# Patient Record
Sex: FEMALE | Race: WHITE | NOT HISPANIC OR LATINO | Employment: OTHER | URBAN - NONMETROPOLITAN AREA
[De-identification: names, ages, dates, MRNs, and addresses within clinical notes are randomized per-mention and may not be internally consistent; named-entity substitution may affect disease eponyms.]

---

## 2017-11-08 ENCOUNTER — TRANSCRIBE ORDERS (OUTPATIENT)
Dept: MAMMOGRAPHY | Facility: HOSPITAL | Age: 67
End: 2017-11-08

## 2017-11-08 DIAGNOSIS — Z12.31 ENCOUNTER FOR SCREENING MAMMOGRAM FOR BREAST CANCER: Primary | ICD-10-CM

## 2017-12-11 ENCOUNTER — HOSPITAL ENCOUNTER (OUTPATIENT)
Dept: MAMMOGRAPHY | Facility: HOSPITAL | Age: 67
Discharge: HOME | End: 2017-12-11
Admitting: INTERNAL MEDICINE

## 2017-12-11 DIAGNOSIS — Z12.31 ENCOUNTER FOR SCREENING MAMMOGRAM FOR BREAST CANCER: ICD-10-CM

## 2017-12-11 PROCEDURE — G0202 SCR MAMMO BI INCL CAD: HCPCS

## 2017-12-11 PROCEDURE — 77063 BREAST TOMOSYNTHESIS BI: CPT

## 2018-06-18 ENCOUNTER — OFFICE VISIT (OUTPATIENT)
Dept: SURGERY | Facility: CLINIC | Age: 68
End: 2018-06-18

## 2018-06-18 VITALS
DIASTOLIC BLOOD PRESSURE: 64 MMHG | TEMPERATURE: 98.4 F | HEART RATE: 78 BPM | HEIGHT: 64 IN | SYSTOLIC BLOOD PRESSURE: 110 MMHG | BODY MASS INDEX: 18.1 KG/M2 | RESPIRATION RATE: 18 BRPM | WEIGHT: 106 LBS | OXYGEN SATURATION: 99 %

## 2018-06-18 DIAGNOSIS — K21.9 GASTROESOPHAGEAL REFLUX DISEASE, ESOPHAGITIS PRESENCE NOT SPECIFIED: ICD-10-CM

## 2018-06-18 DIAGNOSIS — R10.13 EPIGASTRIC PAIN: ICD-10-CM

## 2018-06-18 DIAGNOSIS — Z12.11 SCREENING FOR COLON CANCER: ICD-10-CM

## 2018-06-18 DIAGNOSIS — R13.11 ORAL PHASE DYSPHAGIA: Primary | ICD-10-CM

## 2018-06-18 PROCEDURE — 99204 OFFICE O/P NEW MOD 45 MIN: CPT | Performed by: SURGERY

## 2018-06-18 RX ORDER — FLUTICASONE PROPIONATE 50 MCG
SPRAY, SUSPENSION (ML) NASAL
COMMUNITY

## 2018-06-18 RX ORDER — ONDANSETRON 4 MG/1
TABLET, FILM COATED ORAL
COMMUNITY
Start: 2018-06-14

## 2018-06-18 RX ORDER — DEXLANSOPRAZOLE 60 MG/1
CAPSULE, DELAYED RELEASE ORAL
COMMUNITY
Start: 2018-06-14 | End: 2018-06-29 | Stop reason: ALTCHOICE

## 2018-06-18 RX ORDER — SUCRALFATE 1 G/1
TABLET ORAL
COMMUNITY
Start: 2018-05-29 | End: 2018-06-25 | Stop reason: ALTCHOICE

## 2018-06-18 RX ORDER — LEVOTHYROXINE SODIUM 0.05 MG/1
TABLET ORAL
COMMUNITY

## 2018-06-18 NOTE — PROGRESS NOTES
"Patient: Jennifer Gilliland    YOB: 1950    Date: 06/18/2018    Primary Care Provider: Nathan Fernandez MD    Chief Complaint   Patient presents with   • Heartburn       Subjective .     History of present illness:  Pt is here for evaluation of bouts of heartburn and indigestion and excessive \"burping\" which has been ongoing for @ 3-4 weeks, she also complains of acid reflux and sometimes at night she awakens with \"acid\" in her throat.Pt also complains of recent weight loss @ 4-5 pounds within the last 2 weeks. Pt stated that her symptoms get much worse when eating spicy and greasy food. Pt is currently taking Dexilant and she stated that although she has only taken 4 doses she is feeling \"some better.\" Patient also taking Carafate for reflux with no improvement.  Also on Zofran as needed but she has not taken any of those pills at this point.  No dark stools, pain in epigastric region, radiates to her back and chest.    The following portions of the patient's history were reviewed and updated as appropriate: allergies, current medications, past family history, past medical history, past social history, past surgical history and problem list.      Review of Systems   Constitutional: Negative for chills, fever and unexpected weight change.   HENT: Negative for hearing loss, trouble swallowing and voice change.    Eyes: Negative for visual disturbance.   Respiratory: Negative for apnea, cough, chest tightness, shortness of breath and wheezing.    Cardiovascular: Negative for chest pain, palpitations and leg swelling.   Gastrointestinal: Positive for abdominal distention and nausea. Negative for abdominal pain, anal bleeding, blood in stool, constipation, diarrhea, rectal pain and vomiting.   Endocrine: Negative for cold intolerance and heat intolerance.   Genitourinary: Negative for difficulty urinating, dysuria and flank pain.   Musculoskeletal: Negative for back pain and gait problem.   Skin: Negative for " "color change, rash and wound.   Neurological: Negative for dizziness, syncope, speech difficulty, weakness, light-headedness, numbness and headaches.   Hematological: Negative for adenopathy. Does not bruise/bleed easily.   Psychiatric/Behavioral: Negative for confusion. The patient is not nervous/anxious.        Allergies:  Allergies   Allergen Reactions   • Amoxicillin Rash   • Sulfa Antibiotics Rash       Medications:    Current Outpatient Prescriptions:   •  DEXILANT 60 MG capsule, , Disp: , Rfl:   •  fluticasone (FLONASE) 50 MCG/ACT nasal spray, fluticasone 50 mcg/actuation nasal spray,suspension, Disp: , Rfl:   •  levothyroxine (SYNTHROID, LEVOTHROID) 50 MCG tablet, levothyroxine 50 mcg tablet, Disp: , Rfl:   •  sucralfate (CARAFATE) 1 g tablet, , Disp: , Rfl:   •  ondansetron (ZOFRAN) 4 MG tablet, , Disp: , Rfl:     History\"  Past Medical History:   Diagnosis Date   • Thyroid disease     hypothyroidism       Past Surgical History:   Procedure Laterality Date   • BREAST BIOPSY         Family History   Problem Relation Age of Onset   • Breast cancer Sister        Social History   Substance Use Topics   • Smoking status: Former Smoker   • Smokeless tobacco: Never Used   • Alcohol use No        Objective     Vital Signs:   Vitals:    06/18/18 1345   BP: 110/64   Pulse: 78   Resp: 18   Temp: 98.4 °F (36.9 °C)   TempSrc: Temporal Artery    SpO2: 99%   Weight: 48.1 kg (106 lb)   Height: 162.6 cm (64\")       Physical Exam:   General Appearance:    Alert, cooperative, in no acute distress   Head:    Normocephalic, without obvious abnormality, atraumatic   Eyes:            Lids and lashes normal, conjunctivae and sclerae normal, no   icterus, no pallor, corneas clear, PERRLA   Ears:    Ears appear intact with no abnormalities noted   Throat:   No oral lesions, no thrush, oral mucosa moist   Neck:   No adenopathy, supple, trachea midline, no thyromegaly, no   carotid bruit, no JVD   Lungs:     Clear to " auscultation,respirations regular, even and                  unlabored    Heart:    Regular rhythm and normal rate, normal S1 and S2, no            murmur, no gallop, no rub, no click   Chest Wall:    No abnormalities observed   Abdomen:     Normal bowel sounds, no masses, no organomegaly, soft        And tender in the right upper quadrant.     Extremities:   Moves all extremities well, no edema, no cyanosis, no             redness   Pulses:   Pulses palpable and equal bilaterally   Skin:   No bleeding, bruising or rash   Lymph nodes:   No palpable adenopathy   Neurologic:   Cranial nerves 2 - 12 grossly intact, sensation intact, DTR       present and equal bilaterally     Results Review:   I reviewed the patient's new clinical results.    Assessment/Plan     1. Oral phase dysphagia    2. Gastroesophageal reflux disease, esophagitis presence not specified    3. Screening for colon cancer    4. Epigastric pain        Patient scheduled for EGD tomorrow, concerned about weight loss and significant reflux symptoms.  Patient may have a stricture as well.  Risk of bleeding perforation discussed and patient agreeable.  Patient also will need a colonoscopy in the near future once her resolve her current problem.    I discussed the patients findings and my recommendations with patient    Review of Systems was reviewed and confirmed as accurate today.    Electronically signed by Shanti Arredondo MD  06/18/18      .        Portions of this note have been scribed for Shanti Arredondo MD by Lara Domínguez. 6/18/2018  2:41 PM

## 2018-06-19 ENCOUNTER — OUTSIDE FACILITY SERVICE (OUTPATIENT)
Dept: SURGERY | Facility: CLINIC | Age: 68
End: 2018-06-19

## 2018-06-19 PROCEDURE — 43239 EGD BIOPSY SINGLE/MULTIPLE: CPT | Performed by: SURGERY

## 2018-06-25 ENCOUNTER — OFFICE VISIT (OUTPATIENT)
Dept: SURGERY | Facility: CLINIC | Age: 68
End: 2018-06-25

## 2018-06-25 VITALS
SYSTOLIC BLOOD PRESSURE: 118 MMHG | DIASTOLIC BLOOD PRESSURE: 82 MMHG | HEART RATE: 88 BPM | HEIGHT: 64 IN | BODY MASS INDEX: 17.93 KG/M2 | TEMPERATURE: 98.8 F | WEIGHT: 105 LBS | RESPIRATION RATE: 18 BRPM | OXYGEN SATURATION: 100 %

## 2018-06-25 DIAGNOSIS — K59.04 CHRONIC IDIOPATHIC CONSTIPATION: ICD-10-CM

## 2018-06-25 DIAGNOSIS — R10.11 RIGHT UPPER QUADRANT ABDOMINAL PAIN: ICD-10-CM

## 2018-06-25 DIAGNOSIS — B96.89 GASTRITIS DUE TO HELICOBACTER HEILMANNII: Primary | ICD-10-CM

## 2018-06-25 DIAGNOSIS — K29.60 GASTRITIS DUE TO HELICOBACTER HEILMANNII: Primary | ICD-10-CM

## 2018-06-25 DIAGNOSIS — K21.00 GASTROESOPHAGEAL REFLUX DISEASE WITH ESOPHAGITIS: ICD-10-CM

## 2018-06-25 PROCEDURE — 99213 OFFICE O/P EST LOW 20 MIN: CPT | Performed by: SURGERY

## 2018-06-25 RX ORDER — LANSOPRAZOLE, AMOXICILLIN, CLARITHROMYCIN 30-500-500
KIT ORAL 2 TIMES DAILY
Qty: 14 KIT | Refills: 0 | Status: SHIPPED | OUTPATIENT
Start: 2018-06-25 | End: 2018-06-29 | Stop reason: ALTCHOICE

## 2018-06-25 NOTE — PROGRESS NOTES
"Patient: Jennifer Gilliland    YOB: 1950    Date: 06/25/2018    Primary Care Provider: Nathan Fernandez MD    Reason for Consultation: Follow-up EGD    Chief Complaint   Patient presents with   • Follow-up     egd with biopsies and gallbladder ultrasound.       History of present illness:  I saw the patient in the office today as a followup from their recent EGD with biopsy, the antrum pathology report did show chronic gastritis and Helicobacter, ge junction biopsy showed reactive squamous mucosa with changes compatible with reflux esophagitis and chronic gastritis..  They state that they are still having nausea and lots of belching after eating.  Gallbladder ultrasound was performed on 06/20/2018, it was normal.Continues to have right upper quadrant pain and nausea with bloating.  Minimal relief with PPI and Carafate    The following portions of the patient's history were reviewed and updated as appropriate: allergies, current medications, past family history, past medical history, past social history, past surgical history and problem list.      Review of Systems    Vital Signs:   Vitals:    06/25/18 1414   BP: 118/82   Pulse: 88   Resp: 18   Temp: 98.8 °F (37.1 °C)   TempSrc: Temporal Artery    SpO2: 100%   Weight: 47.6 kg (105 lb)   Height: 162.6 cm (64\")       Allergies:  Allergies   Allergen Reactions   • Amoxicillin Rash   • Sulfa Antibiotics Rash       Medications:    Current Outpatient Prescriptions:   •  DEXILANT 60 MG capsule, , Disp: , Rfl:   •  fluticasone (FLONASE) 50 MCG/ACT nasal spray, fluticasone 50 mcg/actuation nasal spray,suspension, Disp: , Rfl:   •  levothyroxine (SYNTHROID, LEVOTHROID) 50 MCG tablet, levothyroxine 50 mcg tablet, Disp: , Rfl:   •  ondansetron (ZOFRAN) 4 MG tablet, , Disp: , Rfl:     Physical Exam:   General Appearance:    Alert, cooperative, in no acute distress   Abdomen:     no masses, no organomegaly, soft non-tender, non-distended, no guarding, wounds are well " healed, no evidence of recurrent hernia   Chest:      Clear toausculation            Cor:  Regular rate and rhythm      Results Review:   I reviewed the patient's new clinical results.    Assessment / Plan:    1. 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 Gastroesophageal reflux disease with esophagitis    3. Right upper quadrant abdominal pain    4. Chronic idiopathic constipation        I did discuss the situation with the patient today in the office and they have done well from their recent EGD with biopsy. I have told the patient We'll place her on Relpax for 2 weeks, follow-up in 3 weeks, at that time if symptoms persist will need to schedule HIDA scan.  Patient also told to take MiraLAX that she needs it for constipation..    Electronically signed by Shanti Arredondo MD  06/25/18          Portions of this note have been scribed for Shanti Arredondo MD by Lara Domínguez. 6/25/2018  2:48 PM

## 2018-06-26 ENCOUNTER — TELEPHONE (OUTPATIENT)
Dept: SURGERY | Facility: CLINIC | Age: 68
End: 2018-06-26

## 2018-06-26 NOTE — TELEPHONE ENCOUNTER
Patient was given a Pre-Moses she is allergic to Amoxicillin Per Dr Arredondo ask the pharmacist their recommendation in place of amoxicillin. They suggested Peptobismal tetracycline and the Lansoprazole. We agreed

## 2018-06-28 ENCOUNTER — HOSPITAL ENCOUNTER (EMERGENCY)
Facility: HOSPITAL | Age: 68
Discharge: HOME | End: 2018-06-28
Attending: EMERGENCY MEDICINE | Admitting: EMERGENCY MEDICINE

## 2018-06-28 ENCOUNTER — APPOINTMENT (OUTPATIENT)
Dept: GENERAL RADIOLOGY | Facility: HOSPITAL | Age: 68
End: 2018-06-28

## 2018-06-28 ENCOUNTER — TELEPHONE (OUTPATIENT)
Dept: SURGERY | Facility: CLINIC | Age: 68
End: 2018-06-28

## 2018-06-28 VITALS
WEIGHT: 108 LBS | HEART RATE: 67 BPM | HEIGHT: 64 IN | TEMPERATURE: 98.1 F | RESPIRATION RATE: 18 BRPM | BODY MASS INDEX: 18.44 KG/M2 | SYSTOLIC BLOOD PRESSURE: 119 MMHG | DIASTOLIC BLOOD PRESSURE: 58 MMHG | OXYGEN SATURATION: 98 %

## 2018-06-28 DIAGNOSIS — K21.9 GASTROESOPHAGEAL REFLUX DISEASE, ESOPHAGITIS PRESENCE NOT SPECIFIED: ICD-10-CM

## 2018-06-28 DIAGNOSIS — R06.00 DYSPNEA, UNSPECIFIED TYPE: Primary | ICD-10-CM

## 2018-06-28 LAB
ALBUMIN SERPL-MCNC: 4.8 G/DL (ref 3.5–5)
ALBUMIN/GLOB SERPL: 1.7 G/DL (ref 1–2)
ALP SERPL-CCNC: 81 U/L (ref 38–126)
ALT SERPL W P-5'-P-CCNC: 18 U/L (ref 13–69)
ANION GAP SERPL CALCULATED.3IONS-SCNC: 15 MMOL/L (ref 10–20)
AST SERPL-CCNC: 26 U/L (ref 15–46)
BASOPHILS # BLD AUTO: 0.05 10*3/MM3 (ref 0–0.2)
BASOPHILS NFR BLD AUTO: 0.8 % (ref 0–2.5)
BILIRUB SERPL-MCNC: 0.4 MG/DL (ref 0.2–1.3)
BUN BLD-MCNC: 19 MG/DL (ref 7–20)
BUN/CREAT SERPL: 27.1 (ref 7.1–23.5)
CALCIUM SPEC-SCNC: 9.4 MG/DL (ref 8.4–10.2)
CHLORIDE SERPL-SCNC: 103 MMOL/L (ref 98–107)
CO2 SERPL-SCNC: 28 MMOL/L (ref 26–30)
CREAT BLD-MCNC: 0.7 MG/DL (ref 0.6–1.3)
D-DIMER, QUANTITATIVE (MAD,POW, STR): 361 NG/ML (FEU) (ref 0–500)
DEPRECATED RDW RBC AUTO: 43.8 FL (ref 37–54)
EOSINOPHIL # BLD AUTO: 0.06 10*3/MM3 (ref 0–0.7)
EOSINOPHIL NFR BLD AUTO: 1 % (ref 0–7)
ERYTHROCYTE [DISTWIDTH] IN BLOOD BY AUTOMATED COUNT: 12.8 % (ref 11.5–14.5)
GFR SERPL CREATININE-BSD FRML MDRD: 84 ML/MIN/1.73
GLOBULIN UR ELPH-MCNC: 2.8 GM/DL
GLUCOSE BLD-MCNC: 99 MG/DL (ref 74–98)
HCT VFR BLD AUTO: 39.5 % (ref 37–47)
HGB BLD-MCNC: 13.3 G/DL (ref 12–16)
HOLD SPECIMEN: NORMAL
HOLD SPECIMEN: NORMAL
IMM GRANULOCYTES # BLD: 0.01 10*3/MM3 (ref 0–0.06)
IMM GRANULOCYTES NFR BLD: 0.2 % (ref 0–0.6)
LYMPHOCYTES # BLD AUTO: 1.17 10*3/MM3 (ref 0.6–3.4)
LYMPHOCYTES NFR BLD AUTO: 19.1 % (ref 10–50)
MCH RBC QN AUTO: 31.5 PG (ref 27–31)
MCHC RBC AUTO-ENTMCNC: 33.7 G/DL (ref 30–37)
MCV RBC AUTO: 93.6 FL (ref 81–99)
MONOCYTES # BLD AUTO: 0.56 10*3/MM3 (ref 0–0.9)
MONOCYTES NFR BLD AUTO: 9.1 % (ref 0–12)
NEUTROPHILS # BLD AUTO: 4.29 10*3/MM3 (ref 2–6.9)
NEUTROPHILS NFR BLD AUTO: 69.8 % (ref 37–80)
NRBC BLD MANUAL-RTO: 0 /100 WBC (ref 0–0)
NT-PROBNP SERPL-MCNC: 98 PG/ML (ref 0–125)
PLATELET # BLD AUTO: 191 10*3/MM3 (ref 130–400)
PMV BLD AUTO: 11.5 FL (ref 6–12)
POTASSIUM BLD-SCNC: 4 MMOL/L (ref 3.5–5.1)
PROT SERPL-MCNC: 7.6 G/DL (ref 6.3–8.2)
RBC # BLD AUTO: 4.22 10*6/MM3 (ref 4.2–5.4)
SODIUM BLD-SCNC: 142 MMOL/L (ref 137–145)
TROPONIN I SERPL-MCNC: <0.012 NG/ML (ref 0–0.03)
WBC NRBC COR # BLD: 6.14 10*3/MM3 (ref 4.8–10.8)
WHOLE BLOOD HOLD SPECIMEN: NORMAL
WHOLE BLOOD HOLD SPECIMEN: NORMAL

## 2018-06-28 PROCEDURE — 25010000002 DIPHENHYDRAMINE PER 50 MG: Performed by: PHYSICIAN ASSISTANT

## 2018-06-28 PROCEDURE — 85379 FIBRIN DEGRADATION QUANT: CPT | Performed by: PHYSICIAN ASSISTANT

## 2018-06-28 PROCEDURE — 83880 ASSAY OF NATRIURETIC PEPTIDE: CPT | Performed by: EMERGENCY MEDICINE

## 2018-06-28 PROCEDURE — 96374 THER/PROPH/DIAG INJ IV PUSH: CPT

## 2018-06-28 PROCEDURE — 25010000002 ONDANSETRON PER 1 MG: Performed by: PHYSICIAN ASSISTANT

## 2018-06-28 PROCEDURE — 25010000002 METHYLPREDNISOLONE PER 40 MG: Performed by: PHYSICIAN ASSISTANT

## 2018-06-28 PROCEDURE — 80053 COMPREHEN METABOLIC PANEL: CPT | Performed by: EMERGENCY MEDICINE

## 2018-06-28 PROCEDURE — 99284 EMERGENCY DEPT VISIT MOD MDM: CPT

## 2018-06-28 PROCEDURE — 84484 ASSAY OF TROPONIN QUANT: CPT | Performed by: EMERGENCY MEDICINE

## 2018-06-28 PROCEDURE — 96375 TX/PRO/DX INJ NEW DRUG ADDON: CPT

## 2018-06-28 PROCEDURE — 71046 X-RAY EXAM CHEST 2 VIEWS: CPT

## 2018-06-28 PROCEDURE — 93005 ELECTROCARDIOGRAM TRACING: CPT | Performed by: EMERGENCY MEDICINE

## 2018-06-28 PROCEDURE — 85025 COMPLETE CBC W/AUTO DIFF WBC: CPT | Performed by: EMERGENCY MEDICINE

## 2018-06-28 RX ORDER — ONDANSETRON 2 MG/ML
4 INJECTION INTRAMUSCULAR; INTRAVENOUS ONCE
Status: COMPLETED | OUTPATIENT
Start: 2018-06-28 | End: 2018-06-28

## 2018-06-28 RX ORDER — LANSOPRAZOLE 30 MG/1
30 CAPSULE, DELAYED RELEASE ORAL DAILY
COMMUNITY
End: 2018-06-29 | Stop reason: ALTCHOICE

## 2018-06-28 RX ORDER — METRONIDAZOLE 500 MG/1
500 TABLET ORAL 3 TIMES DAILY
COMMUNITY
End: 2018-06-29 | Stop reason: ALTCHOICE

## 2018-06-28 RX ORDER — DIPHENHYDRAMINE HYDROCHLORIDE 50 MG/ML
6 INJECTION INTRAMUSCULAR; INTRAVENOUS ONCE
Status: COMPLETED | OUTPATIENT
Start: 2018-06-28 | End: 2018-06-28

## 2018-06-28 RX ORDER — METHYLPREDNISOLONE SODIUM SUCCINATE 40 MG/ML
80 INJECTION, POWDER, LYOPHILIZED, FOR SOLUTION INTRAMUSCULAR; INTRAVENOUS ONCE
Status: COMPLETED | OUTPATIENT
Start: 2018-06-28 | End: 2018-06-28

## 2018-06-28 RX ORDER — DIPHENHYDRAMINE HCL 12.5MG/5ML
12.5 LIQUID (ML) ORAL ONCE
Status: DISCONTINUED | OUTPATIENT
Start: 2018-06-28 | End: 2018-06-28

## 2018-06-28 RX ORDER — CLARITHROMYCIN 500 MG/1
500 TABLET, COATED ORAL 2 TIMES DAILY
COMMUNITY
End: 2018-06-29 | Stop reason: ALTCHOICE

## 2018-06-28 RX ORDER — SODIUM CHLORIDE 0.9 % (FLUSH) 0.9 %
10 SYRINGE (ML) INJECTION AS NEEDED
Status: DISCONTINUED | OUTPATIENT
Start: 2018-06-28 | End: 2018-06-28 | Stop reason: HOSPADM

## 2018-06-28 RX ADMIN — METHYLPREDNISOLONE SODIUM SUCCINATE 80 MG: 40 INJECTION, POWDER, FOR SOLUTION INTRAMUSCULAR; INTRAVENOUS at 15:05

## 2018-06-28 RX ADMIN — DIPHENHYDRAMINE HYDROCHLORIDE 6 MG: 50 INJECTION, SOLUTION INTRAMUSCULAR; INTRAVENOUS at 15:05

## 2018-06-28 RX ADMIN — ONDANSETRON 4 MG: 2 INJECTION, SOLUTION INTRAMUSCULAR; INTRAVENOUS at 15:05

## 2018-06-28 NOTE — TELEPHONE ENCOUNTER
Patient called and stated that she has been sick all night, can't breath, has to take a deep breath then feels some better. Ask Ilene she suggest that she needs to go to the emergency room at [facility]. Patient understood and stated that she will go.

## 2018-06-29 ENCOUNTER — OFFICE VISIT (OUTPATIENT)
Dept: SURGERY | Facility: CLINIC | Age: 68
End: 2018-06-29

## 2018-06-29 VITALS
BODY MASS INDEX: 18.1 KG/M2 | SYSTOLIC BLOOD PRESSURE: 120 MMHG | HEIGHT: 64 IN | HEART RATE: 76 BPM | RESPIRATION RATE: 16 BRPM | WEIGHT: 106 LBS | DIASTOLIC BLOOD PRESSURE: 84 MMHG | OXYGEN SATURATION: 95 % | TEMPERATURE: 98.5 F

## 2018-06-29 DIAGNOSIS — B96.89 GASTRITIS DUE TO HELICOBACTER HEILMANNII: ICD-10-CM

## 2018-06-29 DIAGNOSIS — K21.00 GASTROESOPHAGEAL REFLUX DISEASE WITH ESOPHAGITIS: ICD-10-CM

## 2018-06-29 DIAGNOSIS — K29.60 GASTRITIS DUE TO HELICOBACTER HEILMANNII: ICD-10-CM

## 2018-06-29 DIAGNOSIS — R10.11 RIGHT UPPER QUADRANT ABDOMINAL PAIN: Primary | ICD-10-CM

## 2018-06-29 DIAGNOSIS — R10.11 RUQ PAIN: Primary | ICD-10-CM

## 2018-06-29 PROCEDURE — 99213 OFFICE O/P EST LOW 20 MIN: CPT | Performed by: SURGERY

## 2018-06-29 NOTE — PROGRESS NOTES
"Patient: Jennifer Gilliland    YOB: 1950    Date: 06/29/2018    Primary Care Provider: Nathan Fernandez MD    Chief Complaint   Patient presents with   • Follow-up     abdominal pain with nausea.       History: Pt was recently diagnosed with H-pylori, she was being treated with Biaxin, Flagyl  and Prevacid, she is here for follow-up emergency room visit yesterday, she stated that she was having shortness of breath and epigastric pain with nausea when she went to the emergency room, exam at the emergency room determined that the patient was\" intolerant to Flagyl vs Biaxin.\"  Pt stated that she was told to stop taking the medications and she is feeling better although she is still nauseated and having some pain when she eats.Patient does have allergies, chest pain not consistent with coronary disease, cardiac workup was negative in ER last night.  Gallbladder ultrasound was normal, patient still will need a gallbladder HIDA scan.    The following portions of the patient's history were reviewed and updated as appropriate: allergies, current medications, past family history, past medical history, past social history, past surgical history and problem list.      Review of Systems   Constitutional: Negative for chills, fever and unexpected weight change.   HENT: Negative for hearing loss, trouble swallowing and voice change.    Eyes: Negative for visual disturbance.   Respiratory: Negative for apnea, cough, chest tightness, shortness of breath and wheezing.    Cardiovascular: Negative for chest pain, palpitations and leg swelling.   Gastrointestinal: Positive for abdominal pain and nausea. Negative for abdominal distention, anal bleeding, blood in stool, constipation, diarrhea, rectal pain and vomiting.   Endocrine: Negative for cold intolerance and heat intolerance.   Genitourinary: Negative for difficulty urinating, dysuria and flank pain.   Musculoskeletal: Negative for back pain and gait problem.   Skin: " "Negative for color change, rash and wound.   Neurological: Negative for dizziness, syncope, speech difficulty, weakness, light-headedness, numbness and headaches.   Hematological: Negative for adenopathy. Does not bruise/bleed easily.   Psychiatric/Behavioral: Negative for confusion. The patient is not nervous/anxious.        Vital Signs  Vitals:    06/29/18 1332   BP: 120/84   Pulse: 76   Resp: 16   Temp: 98.5 °F (36.9 °C)   TempSrc: Temporal Artery    SpO2: 95%   Weight: 48.1 kg (106 lb)   Height: 162.6 cm (64\")       Allergies:  Allergies   Allergen Reactions   • Amoxicillin Rash   • Sulfa Antibiotics Rash       Medications:    Current Outpatient Prescriptions:   •  fluticasone (FLONASE) 50 MCG/ACT nasal spray, fluticasone 50 mcg/actuation nasal spray,suspension, Disp: , Rfl:   •  levothyroxine (SYNTHROID, LEVOTHROID) 50 MCG tablet, levothyroxine 50 mcg tablet, Disp: , Rfl:   •  ondansetron (ZOFRAN) 4 MG tablet, , Disp: , Rfl:   •  diphenhydrAMINE (BENYLIN) 12.5 MG/5ML syrup, Take 5 mL by mouth 2 (Two) Times a Day As Needed for Allergies., Disp: 100 mL, Rfl: 0  No current facility-administered medications for this visit.     Physical Exam:   General Appearance:    Alert, cooperative, in no acute distress   Head:    Normocephalic, without obvious abnormality, atraumatic   Lungs:     Clear to auscultation,respirations regular, even and                  unlabored    Heart:    Regular rhythm and normal rate, normal S1 and S2, no            murmur, no gallop, no rub, no click   Abdomen:     Normal bowel sounds, no masses, no organomegaly, soft       And tender in the right upper quadrant with moderate guarding.     Extremities:   Moves all extremities well, no edema, no cyanosis, no             redness   Pulses:   Pulses palpable and equal bilaterally   Skin:   No bleeding, bruising or rash       Results Review:   I reviewed the patient's new clinical results.     Assessment/Plan     1. Right upper quadrant abdominal " pain    2. 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 Gastroesophageal reflux disease with esophagitis      Patient scheduled for HIDA scan and follow-up in one week.  Also told to hold off on taking Prevpac.  If HIDA scan negative, we'll try to attempt another type antibiotics to treat H. pylori infection.  Continue a low-fat diet.  Continue her PPI medication in the interim.    Electronically signed by Shanti Arredondo MD  06/29/18    Portions of this note have been scribed for Shanti Arredondo MD by Lara Domínguez. 6/29/2018  2:35 PM

## 2018-07-03 ENCOUNTER — HOSPITAL ENCOUNTER (OUTPATIENT)
Dept: NUCLEAR MEDICINE | Facility: HOSPITAL | Age: 68
Discharge: HOME | End: 2018-07-03
Attending: SURGERY

## 2018-07-03 DIAGNOSIS — R10.11 RUQ PAIN: ICD-10-CM

## 2018-07-03 PROCEDURE — A9537 TC99M MEBROFENIN: HCPCS | Performed by: SURGERY

## 2018-07-03 PROCEDURE — 25010000002 SINCALIDE PER 5 MCG: Performed by: SURGERY

## 2018-07-03 PROCEDURE — 0 TECHNETIUM TC 99M MEBROFENIN KIT: Performed by: SURGERY

## 2018-07-03 PROCEDURE — 78227 HEPATOBIL SYST IMAGE W/DRUG: CPT

## 2018-07-03 RX ORDER — KIT FOR THE PREPARATION OF TECHNETIUM TC 99M MEBROFENIN 45 MG/10ML
1 INJECTION, POWDER, LYOPHILIZED, FOR SOLUTION INTRAVENOUS
Status: COMPLETED | OUTPATIENT
Start: 2018-07-03 | End: 2018-07-03

## 2018-07-03 RX ADMIN — MEBROFENIN 1 DOSE: 45 INJECTION, POWDER, LYOPHILIZED, FOR SOLUTION INTRAVENOUS at 11:52

## 2018-07-03 RX ADMIN — SINCALIDE 1 MCG: 5 INJECTION, POWDER, LYOPHILIZED, FOR SOLUTION INTRAVENOUS at 12:25

## 2018-07-06 ENCOUNTER — OFFICE VISIT (OUTPATIENT)
Dept: SURGERY | Facility: CLINIC | Age: 68
End: 2018-07-06

## 2018-07-06 VITALS
BODY MASS INDEX: 18.1 KG/M2 | TEMPERATURE: 99.1 F | WEIGHT: 106 LBS | HEART RATE: 76 BPM | SYSTOLIC BLOOD PRESSURE: 132 MMHG | OXYGEN SATURATION: 99 % | DIASTOLIC BLOOD PRESSURE: 72 MMHG | RESPIRATION RATE: 14 BRPM | HEIGHT: 64 IN

## 2018-07-06 DIAGNOSIS — K82.8 BILIARY DYSKINESIA: Primary | ICD-10-CM

## 2018-07-06 DIAGNOSIS — K21.00 GASTROESOPHAGEAL REFLUX DISEASE WITH ESOPHAGITIS: ICD-10-CM

## 2018-07-06 PROCEDURE — 99213 OFFICE O/P EST LOW 20 MIN: CPT | Performed by: SURGERY

## 2018-07-06 NOTE — PROGRESS NOTES
Patient: Jennifer Gilliland    YOB: 1950    Date: 07/06/2018    Primary Care Provider: Nathan Fernandez MD    Chief Complaint   Patient presents with   • Follow-up     follow up hida scan results       Subjective .     History of present illness:  I saw the patient in the office today as a consultation for evaluation and treatment of nausea.  Patient had a hida scan EF 32.4%. Patient has symptoms with nausea and pain reproduced by Kinevac.  Patient has daily episodes of nausea and vomiting with intolerance of almost any food.  No significant gastritis or esophagitis found on EGD.  She does have H pylori, unable to tolerate Prevpac at this time.    The following portions of the patient's history were reviewed and updated as appropriate: allergies, current medications, past family history, past medical history, past social history, past surgical history and problem list.    Review of Systems   Constitutional: Positive for appetite change. Negative for fatigue and fever.   HENT: Negative for congestion, nosebleeds and postnasal drip.    Eyes: Negative for photophobia.   Respiratory: Negative for cough, choking, chest tightness and shortness of breath.    Cardiovascular: Negative for chest pain, palpitations and leg swelling.   Gastrointestinal: Positive for abdominal pain (right upper quadrant / epigastric pain) and nausea.   Endocrine: Negative for cold intolerance and heat intolerance.   Genitourinary: Negative for flank pain and frequency.   Musculoskeletal: Negative for arthralgias.   Neurological: Negative for seizures, light-headedness, numbness and headaches.   Psychiatric/Behavioral: Negative for agitation, behavioral problems, confusion and hallucinations.       History:  Past Medical History:   Diagnosis Date   • Thyroid disease     hypothyroidism       Past Surgical History:   Procedure Laterality Date   • BREAST BIOPSY         Family History   Problem Relation Age of Onset   • Breast cancer Sister   "      Social History   Substance Use Topics   • Smoking status: Former Smoker   • Smokeless tobacco: Never Used   • Alcohol use No       Allergies:  Allergies   Allergen Reactions   • Amoxicillin Rash   • Sulfa Antibiotics Rash       Medications:    Current Outpatient Prescriptions:   •  diphenhydrAMINE (BENYLIN) 12.5 MG/5ML syrup, Take 5 mL by mouth 2 (Two) Times a Day As Needed for Allergies., Disp: 100 mL, Rfl: 0  •  fluticasone (FLONASE) 50 MCG/ACT nasal spray, fluticasone 50 mcg/actuation nasal spray,suspension, Disp: , Rfl:   •  levothyroxine (SYNTHROID, LEVOTHROID) 50 MCG tablet, levothyroxine 50 mcg tablet, Disp: , Rfl:   •  ondansetron (ZOFRAN) 4 MG tablet, , Disp: , Rfl:     Objective     Vital Signs:   Vitals:    07/06/18 1532   BP: 132/72   BP Location: Right arm   Cuff Size: Small Adult   Pulse: 76   Resp: 14   Temp: 99.1 °F (37.3 °C)   TempSrc: Temporal Artery    SpO2: 99%   Weight: 48.1 kg (106 lb)   Height: 162.6 cm (64\")       Physical Exam:   General Appearance:    Alert, cooperative, in no acute distress   Head:    Normocephalic, without obvious abnormality, atraumatic   Eyes:            Lids and lashes normal, conjunctivae and sclerae normal, no   icterus, no pallor, corneas clear, PERRLA   Ears:    Ears appear intact with no abnormalities noted   Throat:   No oral lesions, no thrush, oral mucosa moist   Neck:   No adenopathy, supple, trachea midline, no thyromegaly, no   carotid bruit, no JVD   Lungs:     Clear to auscultation,respirations regular, even and                  unlabored    Heart:    Regular rhythm and normal rate, normal S1 and S2, no            murmur   Abdomen:     no masses, no organomegaly, soft non-tender, non-distended, no guarding, there is evidence of right upper quadrant tenderness   Extremities:   Moves all extremities well, no edema, no cyanosis, no             redness   Pulses:   Pulses palpable and equal bilaterally   Skin:   No bleeding, bruising or rash   Lymph " nodes:   No palpable adenopathy   Neurologic:   Cranial nerves 2 - 12 grossly intact, sensation intact      Results Review:   I reviewed the patient's new clinical results.    Review of Systems was reviewed and confirmed as accurate today.    Assessment/Plan :    1. Biliary dyskinesia    2. Gastroesophageal reflux disease with esophagitis        I had a detailed and extensive discussion with the patient in the office and they understand that they need to undergo laparoscopic cholecystectomy with intraoperative cholangiography or possible open cholecystectomy. Full risks and benefits of operative versus nonoperative intervention were discussed with the patient and these included things such as nonresolution of symptoms and possible worsening of symptoms without surgical intervention versus infection, bleeding, open cholecystectomy, common bile duct injury, postoperative biliary leakage, need for drain placement, possible inability to perform cholangiography due to inflammation, postoperative abscess, etc with surgical intervention. The patient understands, agrees, and wishes to proceed with the surgical treatment plan as mentioned above. The patient had no questions for me at the end of the discussion.  I did draw a picture of the anatomy for the patient and used this in my informed consent.     I discussed the patients findings and my recommendations with patient.    Electronically signed by Shanti Arredondo MD  07/06/18    Portions of this note have been scribed for Shanti Arredondo MD by Renay Alicea. 7/6/2018  3:44 PM

## 2018-07-10 ENCOUNTER — OUTSIDE FACILITY SERVICE (OUTPATIENT)
Dept: SURGERY | Facility: CLINIC | Age: 68
End: 2018-07-10

## 2018-07-10 PROCEDURE — 47563 LAPARO CHOLECYSTECTOMY/GRAPH: CPT | Performed by: SURGERY

## 2018-07-24 NOTE — PROGRESS NOTES
"Patient: Jennifer Gilliland    YOB: 1950    Date: 07/25/2018    Primary Care Provider: Nathan Fernandez MD    Reason for Consultation: Follow-up lap thiago    Chief Complaint   Patient presents with   • Post-op     lap thiago       History of present illness:  I saw the patient in the office today as a followup from their recent laparoscopic cholecystectomy, pathology showed mild chronic cholecystitis.  They state that they have done well but does complain of feeling like she needs to have diarrhea.     The following portions of the patient's history were reviewed and updated as appropriate: allergies, current medications, past family history, past medical history, past social history, past surgical history and problem list.      Vital Signs:   Vitals:    07/25/18 1043   BP: 140/82   Pulse: 86   Temp: 98.3 °F (36.8 °C)   TempSrc: Temporal Artery    SpO2: 100%   Weight: 48.1 kg (106 lb 0.7 oz)   Height: 162.6 cm (64.02\")       Physical Exam:   General Appearance:    Alert, cooperative, in no acute distress   Abdomen:     no masses, no organomegaly, soft non-tender, non-distended, no guarding, wounds are well healed     Assessment / Plan :    1. Postoperative visit        I did discuss the situation with the patient today in the office and they have done well from their recent laparoscopic cholecystectomy.  I have released the patient back to normal activity, they understand that they need to be careful about heavy lifting.  I need to see the patient back in the office only if they are having further problems, they know to call me if they are. Recommend Questran for postprandial diarrhea.    Electronically signed by Shanti Arredondo MD  07/25/18    Portions of this note have been scribed for Shanti Arredondo MD by Michelle Milligan. 7/25/2018  11:14 AM                    "

## 2018-07-25 ENCOUNTER — OFFICE VISIT (OUTPATIENT)
Dept: SURGERY | Facility: CLINIC | Age: 68
End: 2018-07-25

## 2018-07-25 VITALS
DIASTOLIC BLOOD PRESSURE: 82 MMHG | HEART RATE: 86 BPM | WEIGHT: 106.04 LBS | SYSTOLIC BLOOD PRESSURE: 140 MMHG | BODY MASS INDEX: 18.1 KG/M2 | TEMPERATURE: 98.3 F | HEIGHT: 64 IN | OXYGEN SATURATION: 100 %

## 2018-07-25 DIAGNOSIS — Z48.89 POSTOPERATIVE VISIT: Primary | ICD-10-CM

## 2018-07-25 PROCEDURE — 99024 POSTOP FOLLOW-UP VISIT: CPT | Performed by: SURGERY

## 2018-07-25 RX ORDER — MECLIZINE HCL 12.5 MG/1
12.5 TABLET ORAL 3 TIMES DAILY PRN
COMMUNITY

## 2018-07-25 RX ORDER — CHOLESTYRAMINE 4 G/9G
4 POWDER, FOR SUSPENSION ORAL
Qty: 30 PACKET | Refills: 6 | Status: SHIPPED | OUTPATIENT
Start: 2018-07-25 | End: 2019-08-08 | Stop reason: SDUPTHER

## 2018-07-25 RX ORDER — GUAIFENESIN 600 MG/1
1200 TABLET, EXTENDED RELEASE ORAL 2 TIMES DAILY
COMMUNITY

## 2018-08-24 ENCOUNTER — TELEPHONE (OUTPATIENT)
Dept: SURGERY | Facility: CLINIC | Age: 68
End: 2018-08-24

## 2018-08-24 NOTE — TELEPHONE ENCOUNTER
Patient called stating Dr Arredondo was going to send in antibiotics for the bacteria found on the EGD.  She was suppose to call a few weeks after her lap thiago.

## 2019-08-12 RX ORDER — CHOLESTYRAMINE 4 G/9G
POWDER, FOR SUSPENSION ORAL
Qty: 30 PACKET | Refills: 6 | Status: SHIPPED | OUTPATIENT
Start: 2019-08-12

## 2019-11-26 ENCOUNTER — TRANSCRIBE ORDERS (OUTPATIENT)
Dept: ADMINISTRATIVE | Facility: HOSPITAL | Age: 69
End: 2019-11-26

## 2019-11-26 DIAGNOSIS — Z12.31 BREAST CANCER SCREENING BY MAMMOGRAM: Primary | ICD-10-CM

## 2020-01-27 ENCOUNTER — HOSPITAL ENCOUNTER (OUTPATIENT)
Dept: MAMMOGRAPHY | Facility: HOSPITAL | Age: 70
Discharge: HOME | End: 2020-01-27
Admitting: INTERNAL MEDICINE

## 2020-01-27 DIAGNOSIS — Z12.31 BREAST CANCER SCREENING BY MAMMOGRAM: ICD-10-CM

## 2020-01-27 PROCEDURE — 77067 SCR MAMMO BI INCL CAD: CPT

## 2020-01-27 PROCEDURE — 77063 BREAST TOMOSYNTHESIS BI: CPT

## 2024-09-16 ENCOUNTER — OFFICE VISIT (OUTPATIENT)
Dept: URBAN - METROPOLITAN AREA CLINIC 22 | Facility: CLINIC | Age: 74
End: 2024-09-16
Payer: MEDICARE

## 2024-09-16 DIAGNOSIS — H18.453 NODULAR CORNEAL DEGENERATION, BILATERAL: ICD-10-CM

## 2024-09-16 DIAGNOSIS — H43.812 VITREOUS DEGENERATION, LEFT EYE: ICD-10-CM

## 2024-09-16 DIAGNOSIS — H35.3132 NONEXUDATIVE MACULAR DEGENERATION, INTERMEDIATE DRY STAGE, BILATERAL: ICD-10-CM

## 2024-09-16 DIAGNOSIS — H25.812 COMBINED FORMS OF AGE-RELATED CATARACT, LEFT EYE: ICD-10-CM

## 2024-09-16 DIAGNOSIS — H52.4 PRESBYOPIA: ICD-10-CM

## 2024-09-16 DIAGNOSIS — E11.9 DIABETES MELLITUS TYPE 2 WITHOUT MENTION OF COMPLICATION: Primary | ICD-10-CM

## 2024-09-16 PROCEDURE — 92134 CPTRZ OPH DX IMG PST SGM RTA: CPT

## 2024-09-16 PROCEDURE — 99204 OFFICE O/P NEW MOD 45 MIN: CPT

## 2024-09-16 ASSESSMENT — VISUAL ACUITY
OD: 20/25
OS: 20/50

## 2024-09-16 ASSESSMENT — INTRAOCULAR PRESSURE
OD: 12
OS: 13